# Patient Record
Sex: FEMALE | Race: ASIAN | NOT HISPANIC OR LATINO | Employment: UNEMPLOYED | URBAN - METROPOLITAN AREA
[De-identification: names, ages, dates, MRNs, and addresses within clinical notes are randomized per-mention and may not be internally consistent; named-entity substitution may affect disease eponyms.]

---

## 2017-03-16 ENCOUNTER — GENERIC CONVERSION - ENCOUNTER (OUTPATIENT)
Dept: OTHER | Facility: OTHER | Age: 42
End: 2017-03-16

## 2017-03-20 ENCOUNTER — GENERIC CONVERSION - ENCOUNTER (OUTPATIENT)
Dept: OTHER | Facility: OTHER | Age: 42
End: 2017-03-20

## 2017-03-22 ENCOUNTER — ALLSCRIPTS OFFICE VISIT (OUTPATIENT)
Dept: PERINATAL CARE | Facility: CLINIC | Age: 42
End: 2017-03-22
Payer: COMMERCIAL

## 2017-03-22 PROCEDURE — 99205 OFFICE O/P NEW HI 60 MIN: CPT | Performed by: GENETIC COUNSELOR, MS

## 2017-03-27 ENCOUNTER — GENERIC CONVERSION - ENCOUNTER (OUTPATIENT)
Dept: OTHER | Facility: OTHER | Age: 42
End: 2017-03-27

## 2017-03-29 ENCOUNTER — GENERIC CONVERSION - ENCOUNTER (OUTPATIENT)
Dept: OTHER | Facility: OTHER | Age: 42
End: 2017-03-29

## 2017-04-05 ENCOUNTER — GENERIC CONVERSION - ENCOUNTER (OUTPATIENT)
Dept: OTHER | Facility: OTHER | Age: 42
End: 2017-04-05

## 2017-04-21 ENCOUNTER — GENERIC CONVERSION - ENCOUNTER (OUTPATIENT)
Dept: OTHER | Facility: OTHER | Age: 42
End: 2017-04-21

## 2018-01-09 NOTE — MISCELLANEOUS
Message  Spoke with    He reports that they had testing done through [facility] and were able to get insurance approval       Signatures   Electronically signed by : Lila Salomon, ; Apr 21 2017  2:39PM EST                       (Author)

## 2018-01-10 NOTE — PROGRESS NOTES
Chief Complaint  Patient and , Fredy Worley, seen for preconception genetic counseling to discuss concerns related to his history of spherocytosis and risks and availability of testing for a future pregnancy  The patient has no prior pregnancy history  They were referred by their fertility specialist, Dr Conner Porter, in the hope of identifying the mutation responsible for causing spherocytosis in order to perform PGD in a future pregnancy  To review, Mihir Molina reports having received a diagnosis of hereditary spherocytosis around the age of 3-5 years at which time he had a splenectomy  He reports no on going issues and provided copies of recent blood work demonstrating normal counts  He has seen a hematologist recently who found no issues at the present time  He provided records, some of which are written in [facility], documenting his childhood health history and diagnosis of hereditary spherocytosis, splenectomy etc      Hereditary spherocytosis is a group of conditions characterized by varying degrees of anemia  Many individuals develop splenomegaly necessitating a splenectomy as was the case for the patient's   This condition can be caused by mutations in one of 5 different genes some of which follow though an autosomal dominant pattern of inheritance others an autosomal recessive pattern of inheritance  Mihir Molina denies any prior family history of this condition therefore it is not possible to confirm mode of inheritance given that some individuals with an autosomal dominant form of this condition have a new mutation  Genetic testing for hereditary spherocytosis is available from several laboratories  The patient informed me that [facility] is in network with her insurance  [facility] tests for hereditary spherocytosis as part of a hemolytic anemia panel testing for 28 different genes   They confirmed their decision to pursue this testing and will return to our office to obtain the TRF and have blood drawn  We discussed that a causative mutation is able to be identified approximately 90% of the time therefore it is possible that this testing will not provide them with information to be used for PGD  We discussed that if Dory Robert has an autosomal dominant form of hereditary spherocytosis there is a 50% risk to each future pregnancy for having an affected child  If he has an autosomal recessive form of hereditary spherocytosis then risk for recurrence in a future pregnancy is likely to be low since patient has no family history of this condition  Presumably, genetic test results will help to clarify this risk  This couple had previously undergone expanded carrier screening through [facility] labs Family Prep screen  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  Her  tested negative  The remaining reproductive risk according to the lab report is 1/17,000  The Family Prep Screen includes testing for CF, SMA, Fragile X, hemoglobinopathies and others for a total of 105 conditions  All other conditions tested for were negative  We reviewed the patient's age-related risks for aneuploidy  Assuming she would be 38years of age at the time of delivery of a future pregnancy there is an estimated 1/63 risk for a fetal chromosome abnormality at term  Approximately half of these abnormalities are due to Down syndrome with the remainder due to other chromosome abnormalities  The patient had genetic counseling previously and provided records documenting that visit  We briefly reviewed the available prenatal diagnostic and screening procedures available in a future pregnancy including CVS, amniocentesis, sequential screen, NIPS and level II ultrasound evaluation      Lastly, we discussed the fact that it is important to keep in mind that everyone in the general population regardless of age, family history, or medical background has approximately a 3% risk of having a child with some type of her defect, genetic disease or intellectual disability  Currently there are no tests available to rule out all birth defects or health problems  Family History    1  Family history of malignant neoplasm of breast (V16 3) (Z80 3)    Social History    · Never a smoker    Current Meds   1  Arginine TABS; TAKE TABLET  unsure of dose; Therapy: (Recorded:22Mar2017) to Recorded   2  CoQ10 400 MG Oral Capsule; TAKE CAPSULE  per pr 300 mg twice daily; Therapy: (Rexene Persons) to Recorded   3  Inositol TABS; TAKE TABLET  1000 mg daily; Therapy: (Rexene Persons) to Recorded   4  L-Citrulline CAPS; TAKE CAPSULE  per pt 1000 mg daily; Therapy: (Rexene Persons) to Recorded   5  Prenatal Vitamin TABS; Therapy: (Rexene Persons) to Recorded   6  Vitamin D 2000 UNIT Oral Capsule; Therapy: (Recorded:22Mar2017) to Recorded    Allergies    1  No Known Drug Allergies    2   Seasonal    Vitals  Signs   Recorded: [id number] 00:09FZ   Systolic: 680, LUE, Sitting  Diastolic: 64, LUE, Sitting  Height: 5 ft 9 in  Weight: 165 lb 3 2 oz  BMI Calculated: 24 4  BSA Calculated: 1 9  Pain Scale: 0    Signatures   Electronically signed by : Bhumi Castillo, ; Mar 24 2017 12:14PM EST                       (Author)

## 2018-01-11 NOTE — MISCELLANEOUS
Message  Received letter of denial for coverage of the Hemolytic anemia panel offered by [facility]  Denial states that there are genes on this panel that he does not need to be tested for based on his history  Called patient's  to inform  He will discuss with his wife what they would like to do going forward and get back to me        Signatures   Electronically signed by : Aki Srivastava, ; Apr 5 2017  1:00PM EST                       (Author)

## 2018-01-12 VITALS
DIASTOLIC BLOOD PRESSURE: 64 MMHG | BODY MASS INDEX: 24.47 KG/M2 | SYSTOLIC BLOOD PRESSURE: 100 MMHG | WEIGHT: 165.2 LBS | HEIGHT: 69 IN

## 2018-01-15 NOTE — MISCELLANEOUS
Message  [phone] Received FAX from patient with 's records as well as information she researched on available laboratories  Reviewed information from the available laboratories and called patient to discuss  Left message for her to return call  Explained briefly that I do not have test kits for any of the possible laboratories so we will not be able to draw blood or saliva sample before Wednesday        Signatures   Electronically signed by : Lata Reed, ; Mar 20 2017  3:20PM EST                       (Author)

## 2018-01-15 NOTE — MISCELLANEOUS
Message  Received message from patient's  that prior [place] is required  [employer] and was forwarded to [employer] a company that handles their genetic testing prior [employer]  FAXED pertinent clinical info to # provided ([phone])  Pending case #[id number]  Should receive decision in @3 business days  Called patient's  to inform        Signatures   Electronically signed by : Fina Cho, ; Mar 29 2017  3:05PM EST                       (Author)

## 2018-01-16 NOTE — MISCELLANEOUS
Message  Patient called to report that her insurance told her that codes given required prior authorization  Called the precertification # on back of insurance card  Was told that codes do not require prior authorization ( Reference # for call [id number])  However, she suggested that member check to make sure that his policy covers these codes  Called and spoke with patient's   He will double check  If he confirms that no prior authorization is needed then he will go for blood draw  If prior authorization is needed then he will contact me        Signatures   Electronically signed by : Rod Carey, ; Mar 27 2017  1:03PM EST                       (Author)

## 2018-01-17 NOTE — MISCELLANEOUS
Message  Spoke with genetic counselor, Anali Foreman, from [facility] laboratory ([phone] ) regarding facilitating hereditary spherocytosis testing for patient's   She will initiate order in their system  FAXED pertinent records and patient history form  She will also request that test be expedited  Patient's  can go directly to [facility] lab for testing  Confirmed that we have an account so billing will be performed by the hospital      Called patient and provided CPT ([id number], [id number]) and ICD-10 (D58) codes for insuring that testing will be covered  She already checked that [facility]'s is in network with her insurance  Patient indicated that she and her  will come  the TRF and go for bloodwork this afternoon  Put [facility] forms and script from Dr Tineo Sessions in envelope for patient and  to  at         Signatures   Electronically signed by : Rosales Mesa, ; Mar 24 2017 12:27PM EST                       (Author)